# Patient Record
(demographics unavailable — no encounter records)

---

## 2025-01-22 NOTE — HISTORY OF PRESENT ILLNESS
[FreeTextEntry1] : 2 year old male with delayed speech and social skills and sensory sx (flaps, toe walks). Pt says 3 single words. Eye contact and name response are inconsistent. He just completed an Early Intervention evaluation. Mom is set to meet with them tomorrow to find out what services will be provided. Walked at 14 months old. PMH +ve for single 3 minute febrile seizure in December 2024. Temp 106 with no source identified. FMH +ve for ASD in a 2nd cousin, and epilepsy in another 2nd cousin. No FMH of febrile seizures. Birth: FTNSVD no complications. NKA. On no meds.

## 2025-01-22 NOTE — PHYSICAL EXAM
[FreeTextEntry1] : Alert, NAD. Non-verbal. Intermittent eye contact. HC 51 cm. Heart sounds NL. Neck FROM. PERRL, EOMI, face symmetric, hearing grossly intact. Tone, power, gait NL. No nystagmus or tremor.

## 2025-01-22 NOTE — CONSULT LETTER
[Dear  ___] : Dear  [unfilled], [Please see my note below.] : Please see my note below. [Sincerely,] : Sincerely, [FreeTextEntry1] : Thank you for sending  MARILEE RIVERA  to me for neurological evaluation. This is an initial encounter with a new pt. [FreeTextEntry3] : Dr Donovan

## 2025-01-22 NOTE — DISCUSSION/SUMMARY
[FreeTextEntry1] : Autistic spectrum disorder. Will get EEG. RTO prn. Rx written for chloral hydrate 1500 mg with 1 refill. Note sent to Dr Yan(PCP) advising to do BW (CBC,CMP,TFT,Lead,Fragile X). Total clinician time spent on 1/22/2025 is 48 minutes including preparing to see the patient, obtaining and/or reviewing and confirming history, performing a medically necessary and appropriate examination, counseling and educating the patient and/or family, documenting clinical information in the EHR and communicating and/or referring to other healthcare professionals.

## 2025-07-29 NOTE — REASON FOR VISIT
[Initial Consultation] : an initial consultation for [Autism Spectrum Disorder] : autism spectrum disorder [Developmental Delay] : developmental delay [Mother] : mother

## 2025-07-29 NOTE — PLAN
[Continue IFSP] : - Continue services as presently provided for in the Individualized Family Service Plan [Genetics] : - Medical Geneticist [Home Behavior Techniques] : - Specific behavioral techniques that can be implemented at home were discussed [Limit Screen Time] : - Limit screen time [[email or web address]] : - [email or web address] - Autism Speaks [Follow-up visit (re-evaluation): _____] : - Follow-up visit in [unfilled]  for re-evaluation. [Teacher BRS] : - Newly completed teacher behavior rating scale(s) [IEP or IFSP] : - Copy of most recent Individualized Education Program (IEP) or Family Service Plan (IFSP) [Test reports] : - Reports of most recent psychological, educational, speech/language, PT, OT test results [Findings (To Date)] : Findings from evaluation (to date) [Clinical Basis] : Clinical basis for current diagnosis and clinical impressions [Differential Diagnosis] : Differential diagnosis [Co-Morbidities] : Clinical disorders and problem commonly associated with this child's condition (now or in the future) [Prognosis] : Prognosis [Dev. Therapies: ____] : Benefits and limits of developmental therapies: [unfilled] [CAM Therapies] : Benefits and limits of CAM therapies [Behavior Modification] : Behavior modification strategies [Resources] : Other available resources [CPSE / IEP] : Committee on  Special Education (CPSE) evaluations and Individualized Education Programs (IEP) [Class Placement] : Appropriate class placement [Other: _____] : [unfilled] [Family Questions] : Family's questions were addressed [Sleep] : The importance of sleep and strategies to ensure adequate sleep [Media / Screen Time] : Importance of limiting electronics, media, and screen time [FreeTextEntry8] : -discussed with parent that there is not supporting scientific evidence that alternative treatments such as restrictive diets (gluten-free and casein-free), supplements, CBD oil, etc. are beneficial in the treatment of ASD  [de-identified] : - www.includenyc.org for community resources and information about special education supports and related services for children with needs  [FreeTextEntry1] : Farhad Small MD Director, Division of Developmental-Behavioral Pediatrics [facility], [facility] Certified Developmental-Behavioral Pediatrician

## 2025-07-29 NOTE — HISTORY OF PRESENT ILLNESS
[de-identified] : MARILEE RIVERA is a 2 year old boy with previously diagnosed autism spectrum disorder and global developmental delay. He was diagnosed with ASD at 23 months old by a psychologist through the Early Intervention Program

## 2025-07-29 NOTE — PHYSICAL EXAM
Aldair Mcfadden called requesting a refill on the following medications:  Requested Prescriptions     Pending Prescriptions Disp Refills    HYDROcodone-acetaminophen (NORCO) 5-325 MG per tablet 28 tablet 0     Sig: Take 1 tablet by mouth every 6 hours as needed for Pain for up to 7 days. Intended supply: 7 days.  Take lowest dose possible to manage pain    cyclobenzaprine (FLEXERIL) 10 MG tablet 15 tablet 0     Sig: Take 1 tablet by mouth 3 times daily as needed for Muscle spasms     Pharmacy verified:[facility]   .pv      Date of last visit: 12/4/20  Date of next visit (if applicable): 5/08/1114 [Well Developed] : well developed [Well Nourished] : well nourished [No Apparent Distress] : no apparent distress [Normal] : gait is age appropriate [Difficult to engage in play] : difficult to engage in play [Appropriate eye contact] : no appropriate eye contact [Responds to name] : does not respond to name [de-identified] : He was self directed. after calming down he performed some tasks. He  was heard singing the alphabet

## 2025-07-29 NOTE — PLAN
[Continue IFSP] : - Continue services as presently provided for in the Individualized Family Service Plan [Genetics] : - Medical Geneticist [Home Behavior Techniques] : - Specific behavioral techniques that can be implemented at home were discussed [Limit Screen Time] : - Limit screen time [[email or web address]] : - [email or web address] - Autism Speaks [Follow-up visit (re-evaluation): _____] : - Follow-up visit in [unfilled]  for re-evaluation. [Teacher BRS] : - Newly completed teacher behavior rating scale(s) [IEP or IFSP] : - Copy of most recent Individualized Education Program (IEP) or Family Service Plan (IFSP) [Test reports] : - Reports of most recent psychological, educational, speech/language, PT, OT test results [Findings (To Date)] : Findings from evaluation (to date) [Clinical Basis] : Clinical basis for current diagnosis and clinical impressions [Differential Diagnosis] : Differential diagnosis [Co-Morbidities] : Clinical disorders and problem commonly associated with this child's condition (now or in the future) [Prognosis] : Prognosis [Dev. Therapies: ____] : Benefits and limits of developmental therapies: [unfilled] [CAM Therapies] : Benefits and limits of CAM therapies [Behavior Modification] : Behavior modification strategies [Resources] : Other available resources [CPSE / IEP] : Committee on  Special Education (CPSE) evaluations and Individualized Education Programs (IEP) [Class Placement] : Appropriate class placement [Other: _____] : [unfilled] [Family Questions] : Family's questions were addressed [Sleep] : The importance of sleep and strategies to ensure adequate sleep [Media / Screen Time] : Importance of limiting electronics, media, and screen time [FreeTextEntry8] : -discussed with parent that there is not supporting scientific evidence that alternative treatments such as restrictive diets (gluten-free and casein-free), supplements, CBD oil, etc. are beneficial in the treatment of ASD  [de-identified] : - www.includenyc.org for community resources and information about special education supports and related services for children with needs  [FreeTextEntry1] : Farhad Small MD Director, Division of Developmental-Behavioral Pediatrics [facility], [facility] Certified Developmental-Behavioral Pediatrician

## 2025-07-29 NOTE — HISTORY OF PRESENT ILLNESS
[de-identified] : MARILEE RIVERA is a 2 year old boy with previously diagnosed autism spectrum disorder and global developmental delay. He was diagnosed with ASD at 23 months old by a psychologist through the Early Intervention Program

## 2025-07-29 NOTE — PHYSICAL EXAM
[Well Developed] : well developed [Well Nourished] : well nourished [No Apparent Distress] : no apparent distress [Normal] : gait is age appropriate [Difficult to engage in play] : difficult to engage in play [Appropriate eye contact] : no appropriate eye contact [Responds to name] : does not respond to name [de-identified] : He was self directed. after calming down he performed some tasks. He  was heard singing the alphabet